# Patient Record
Sex: FEMALE | Race: BLACK OR AFRICAN AMERICAN | URBAN - METROPOLITAN AREA
[De-identification: names, ages, dates, MRNs, and addresses within clinical notes are randomized per-mention and may not be internally consistent; named-entity substitution may affect disease eponyms.]

---

## 2022-11-10 ENCOUNTER — OFFICE VISIT (OUTPATIENT)
Facility: LOCATION | Age: 62
End: 2022-11-10
Payer: MEDICARE

## 2022-11-10 DIAGNOSIS — H20.013 PRIMARY IRIDOCYCLITIS, BILATERAL: Primary | ICD-10-CM

## 2022-11-10 PROCEDURE — 92004 COMPRE OPH EXAM NEW PT 1/>: CPT

## 2022-11-10 RX ORDER — ATROPINE SULFATE 10 MG/ML
1 % SOLUTION/ DROPS OPHTHALMIC
Qty: 2 | Refills: 0 | Status: INACTIVE
Start: 2022-11-10 | End: 2022-11-12

## 2022-11-10 ASSESSMENT — INTRAOCULAR PRESSURE
OD: 32
OS: 36

## 2022-11-10 NOTE — IMPRESSION/PLAN
Impression: Primary iridocyclitis, bilateral: H20.013. Plan: Pt educated on condition. Pt to continue steroid eye drop and pressure lower drop. Rxed atropine. Expressed the importance of using the steroid drops as prescribed to decrease the inflammation. Chronic condition. Ordered labs (CBC, RPR, HLA-B27, ACE, and quantiferon), order form given to pt and told to take it to their preferred establishment the draws blood like [facility], 

Pressures elevated today, pt not taking brimonidine Pt has sulfa allergy and COPD If pressures remain elevated at f/u will refer to Dr. Bertram Fountain. Rx:
Prednisolone acetate QID Brimonidine BID Atropine QD x3 days then dc, warned the pt that the drop will dilate the eye and to wear eye protection when outside RTC: 4-5 days for follow up

## 2022-11-15 ENCOUNTER — OFFICE VISIT (OUTPATIENT)
Facility: LOCATION | Age: 62
End: 2022-11-15
Payer: MEDICARE

## 2022-11-15 DIAGNOSIS — H20.013 PRIMARY IRIDOCYCLITIS, BILATERAL: Primary | ICD-10-CM

## 2022-11-15 PROCEDURE — 99213 OFFICE O/P EST LOW 20 MIN: CPT

## 2022-11-15 RX ORDER — NETARSUDIL 0.2 MG/ML
0.02 % SOLUTION/ DROPS OPHTHALMIC; TOPICAL
Qty: 2.5 | Refills: 3 | Status: ACTIVE
Start: 2022-11-15

## 2022-11-15 RX ORDER — LOTEPREDNOL ETABONATE 5 MG/ML
0.5 % SUSPENSION/ DROPS OPHTHALMIC
Qty: 5 | Refills: 0 | Status: ACTIVE
Start: 2022-11-15

## 2022-11-15 ASSESSMENT — INTRAOCULAR PRESSURE
OD: 48
OS: 49
OS: 14
OS: 46
OD: 47
OD: 15

## 2022-11-15 NOTE — IMPRESSION/PLAN
Impression: Primary iridocyclitis, bilateral: H20.013. Ordered labs (CBC, RPR, HLA-B27, ACE, and quantiferon) 11/10/2022 Pt has sulfa allergy and COPD Plan: Trace cells. Pressures significantly elevated today. Pt has been taking her brimonidine  BID OU as prescribed and prednisolone acetate QID. Will change prednisolone to lotamax to help with pressure spike. Pt will likely need glaucoma surgery and cataract surgery. Pt made aware of that the process of surgery and recovery will take a couple of months. Dr. Robson Salazar preformed a AC tap to lower pressure in office today. Proparacaine and betadine 5% used in office OU. 1cc syringe used OU OD: 0.067 ac fluid removed OS: 0.07 ac fluid removed 1 drop [address] OU [address] in office No issues with procedure Pressures 15/14mmHg after tap 

d/c pred, start lotamax BID OU
continue brimonidine BID OU and start rhopressa QHS OU 
samples of lotamax and rhopressa given to pt Dr. Robson Salazar will f/u with pt in the next couple of days.